# Patient Record
Sex: FEMALE | Race: WHITE | NOT HISPANIC OR LATINO | Employment: FULL TIME | URBAN - METROPOLITAN AREA
[De-identification: names, ages, dates, MRNs, and addresses within clinical notes are randomized per-mention and may not be internally consistent; named-entity substitution may affect disease eponyms.]

---

## 2023-08-30 ENCOUNTER — OFFICE VISIT (OUTPATIENT)
Dept: URGENT CARE | Facility: CLINIC | Age: 33
End: 2023-08-30
Payer: COMMERCIAL

## 2023-08-30 VITALS
SYSTOLIC BLOOD PRESSURE: 90 MMHG | BODY MASS INDEX: 21.97 KG/M2 | WEIGHT: 140 LBS | HEIGHT: 67 IN | TEMPERATURE: 98.6 F | RESPIRATION RATE: 16 BRPM | HEART RATE: 72 BPM | OXYGEN SATURATION: 98 % | DIASTOLIC BLOOD PRESSURE: 60 MMHG

## 2023-08-30 DIAGNOSIS — S90.851A: ICD-10-CM

## 2023-08-30 PROCEDURE — 10120 INC&RMVL FB SUBQ TISS SMPL: CPT | Mod: RT | Performed by: NURSE PRACTITIONER

## 2023-08-30 PROCEDURE — 3078F DIAST BP <80 MM HG: CPT | Performed by: NURSE PRACTITIONER

## 2023-08-30 PROCEDURE — 3074F SYST BP LT 130 MM HG: CPT | Performed by: NURSE PRACTITIONER

## 2023-08-30 ASSESSMENT — ENCOUNTER SYMPTOMS
FEVER: 0
MYALGIAS: 0
TINGLING: 0
CHILLS: 0
SENSORY CHANGE: 0
FOCAL WEAKNESS: 0

## 2023-08-31 NOTE — PROGRESS NOTES
Subjective     Tita Rubin is a 33 y.o. female who presents with Foot Problem (Stepped on glass L heel )            HPI  New problem.  Patient is a 33-year-old female who presents with possible glass in her right heel.  She apparently stepped on some glass even though she was wearing a sandal and felt this go in.  After trying to get it out herself she came in to be seen by us.  She has pain in this lower heel however no visual on the piece of glass.  She states she is up-to-date on tetanus.    Patient has no known allergies.  Current Outpatient Medications on File Prior to Visit   Medication Sig Dispense Refill    SUMAtriptan (IMITREX) 25 MG Tab tablet  (Patient not taking: Reported on 8/30/2023)      valACYclovir (VALTREX) 500 MG Tab       tretinoin (RETIN-A) 0.025 % cream  (Patient not taking: Reported on 8/30/2023)      sulfamethoxazole-trimethoprim (BACTRIM DS) 800-160 MG tablet       scopolamine (TRANSDERM-SCOP) 1 mg/72hr PATCH 72 HR  (Patient not taking: Reported on 8/30/2023)      clindamycin-benzoyl peroxide (BENZACLIN) gel        No current facility-administered medications on file prior to visit.     Social History     Socioeconomic History    Marital status: Single     Spouse name: Not on file    Number of children: 0    Years of education: Not on file    Highest education level: Not on file   Occupational History    Not on file   Tobacco Use    Smoking status: Never    Smokeless tobacco: Never   Vaping Use    Vaping Use: Never used   Substance and Sexual Activity    Alcohol use: Yes     Comment: Casual    Drug use: Not Currently    Sexual activity: Yes     Partners: Male     Birth control/protection: I.U.D.   Other Topics Concern    Not on file   Social History Narrative    Not on file     Social Determinants of Health     Financial Resource Strain: Not on file   Food Insecurity: Not on file   Transportation Needs: Not on file   Physical Activity: Not on file   Stress: Not on file   Social  "Connections: Not on file   Intimate Partner Violence: Not on file   Housing Stability: Not on file     Family history is unknown by patient.      Review of Systems   Constitutional:  Negative for chills and fever.   Musculoskeletal:  Negative for joint pain and myalgias.   Skin:         Possible foreign body in right foot.   Neurological:  Negative for tingling, sensory change and focal weakness.   All other systems reviewed and are negative.             Objective     BP 90/60 (BP Location: Left arm, Patient Position: Sitting, BP Cuff Size: Adult)   Pulse 72   Temp 37 °C (98.6 °F) (Temporal)   Resp 16   Ht 1.702 m (5' 7\")   Wt 63.5 kg (140 lb)   SpO2 98%   BMI 21.93 kg/m²      Physical Exam  Constitutional:       Appearance: Normal appearance. She is not ill-appearing.   Musculoskeletal:         General: Normal range of motion.   Skin:     General: Skin is warm and dry.      Comments: Wound to the bottom medial aspect of her right heel.  It is difficult to feel this piece of glass.   Neurological:      General: No focal deficit present.      Mental Status: She is alert and oriented to person, place, and time.   Psychiatric:         Mood and Affect: Mood normal.         Behavior: Behavior normal.     Procedure: Incision and Drainage  -Risks, benefits, and alternatives discussed. Risks include infection, bleeding, nerve damage, and poor cosmetic outcome  -Clean technique with sterile instruments  -Local anesthesia with 2% lidocaine with epinephrine  -Incision with #11 blade- possible small shard of glass removed. -Irrigated copiously with sterile saline  -Minimal bleeding with good hemostasis achieved  -The patient tolerated the procedure well                          Assessment & Plan        1. Acute foreign body of heel, right, initial encounter        Patient advised on wound care and return precautions.  I feel we got this foreign body but she will know when her numbness wears off and she still has " pain.